# Patient Record
Sex: FEMALE | Race: OTHER | NOT HISPANIC OR LATINO | Employment: FULL TIME | URBAN - METROPOLITAN AREA
[De-identification: names, ages, dates, MRNs, and addresses within clinical notes are randomized per-mention and may not be internally consistent; named-entity substitution may affect disease eponyms.]

---

## 2024-08-13 ENCOUNTER — NON-PROVIDER VISIT (OUTPATIENT)
Dept: URGENT CARE | Facility: PHYSICIAN GROUP | Age: 44
End: 2024-08-13
Payer: COMMERCIAL

## 2024-08-13 ENCOUNTER — HOSPITAL ENCOUNTER (OUTPATIENT)
Dept: RADIOLOGY | Facility: MEDICAL CENTER | Age: 44
End: 2024-08-13
Attending: FAMILY MEDICINE
Payer: COMMERCIAL

## 2024-08-13 ENCOUNTER — OCCUPATIONAL MEDICINE (OUTPATIENT)
Dept: URGENT CARE | Facility: PHYSICIAN GROUP | Age: 44
End: 2024-08-13
Payer: COMMERCIAL

## 2024-08-13 VITALS
HEIGHT: 64 IN | BODY MASS INDEX: 32.03 KG/M2 | DIASTOLIC BLOOD PRESSURE: 80 MMHG | TEMPERATURE: 97.5 F | WEIGHT: 187.6 LBS | OXYGEN SATURATION: 99 % | HEART RATE: 76 BPM | RESPIRATION RATE: 22 BRPM | SYSTOLIC BLOOD PRESSURE: 126 MMHG

## 2024-08-13 DIAGNOSIS — S69.92XA INJURY OF LEFT HAND, INITIAL ENCOUNTER: ICD-10-CM

## 2024-08-13 DIAGNOSIS — Z02.1 PRE-EMPLOYMENT DRUG SCREENING: ICD-10-CM

## 2024-08-13 DIAGNOSIS — S60.222A CONTUSION OF LEFT HAND, INITIAL ENCOUNTER: ICD-10-CM

## 2024-08-13 LAB
AMP AMPHETAMINE: NORMAL
BREATH ALCOHOL COMMENT: NORMAL
COC COCAINE: NORMAL
INT CON NEG: NORMAL
INT CON POS: NORMAL
MET METHAMPHETAMINES: NORMAL
OPI OPIATES: NORMAL
PCP PHENCYCLIDINE: NORMAL
POC BREATHALIZER: 0 PERCENT (ref 0–0.01)
POC DRUG COMMENT 753798-OCCUPATIONAL HEALTH: NEGATIVE
THC: NORMAL

## 2024-08-13 PROCEDURE — 3074F SYST BP LT 130 MM HG: CPT | Performed by: FAMILY MEDICINE

## 2024-08-13 PROCEDURE — 82075 ASSAY OF BREATH ETHANOL: CPT | Performed by: FAMILY MEDICINE

## 2024-08-13 PROCEDURE — 3079F DIAST BP 80-89 MM HG: CPT | Performed by: FAMILY MEDICINE

## 2024-08-13 PROCEDURE — 99203 OFFICE O/P NEW LOW 30 MIN: CPT | Performed by: FAMILY MEDICINE

## 2024-08-13 PROCEDURE — 73130 X-RAY EXAM OF HAND: CPT | Mod: LT

## 2024-08-13 PROCEDURE — 80305 DRUG TEST PRSMV DIR OPT OBS: CPT | Performed by: FAMILY MEDICINE

## 2024-08-13 RX ORDER — IBUPROFEN 200 MG
600 TABLET ORAL ONCE
Status: COMPLETED | OUTPATIENT
Start: 2024-08-13 | End: 2024-08-13

## 2024-08-13 RX ADMIN — Medication 600 MG: at 17:46

## 2024-08-13 NOTE — LETTER
PHYSICIAN’S AND CHIROPRACTIC PHYSICIAN'S   PROGRESS REPORT CERTIFICATION OF DISABILITY Claim Number:     Social Security Number:    Patient’s Name:Gale Hale Date of Injury:8/13/2024   Employer: [employer] Name of O (if applicable)      Patient’s Job Description/Occupation: housekeeping       Previous Injuries/Diseases/Surgeries Contributing to the Condition:  NA      Diagnosis:  (S69.92XA) Injury of left hand, initial encounter  (S60.222A) Contusion of left hand, initial encounter      Related to the Industrial Injury? Yes     Explain:Left hand struck between closing sliding door and frame      Objective Medical Findings:Left hand: tender 2-4 metatarsal, MCP joint, and proximal phalanges without obvious deformity. Skin intact. Distal neuro/vascular intact.           None - Discharged                         Stable  No                 Ratable  No       Generally Improved                        Condition Worsened               X  Condition Same  May Have Suffered a Permanent Disability  No     Treatment Plan:    Relative rest, ice, compression, OTC NSAID as needed        No Change in Therapy                 PT/OT Prescribed                   X  Medication May be Used While Working       Case Management                        PT/OT Discontinued    Consultation    Further Diagnostic Studies:                               Prescription(s) Left hand x-ray negative for fracture or dislocation                           Released to FULL DUTY /No Restrictions on (Date):  From:      Certified TOTALLY TEMPORARILY DISABLED (Indicate Dates) From:   To:    X  Released to RESTRICTED/Modified Duty on (Date): From: 8/13/2024 To: 8/16/2024                                                               Restrictions Are:  Temporary     No Sitting                               No Standing                 X  No Pulling                  Other: Restrictions are for left upper extremity only     No Bending at Waist           No Stooping                  X  No Lifting       No Carrying                           No Walking                Lifting Restricted to (lbs.):     X  No Pushing                            No Climbing                   No Reaching Above Shoulders   Date of Next Visit:  8/16/2024 Date of this Exam:8/13/2024 Physician/Chiropractic Physician Name:Yousif Shah M.D. Physician/Chiropractic Physician Signature:  Imtiaz Null DO MPH   D-39 (Rev. 2/24)

## 2024-08-13 NOTE — LETTER
"    EMPLOYEE’S CLAIM FOR COMPENSATION/ REPORT OF INITIAL TREATMENT  FORM C-4  PLEASE TYPE OR PRINT    EMPLOYEE’S CLAIM - PROVIDE ALL INFORMATION REQUESTED   First Name                    ROHITH Beasley                  Last Name  Shayna Hale Birthdate                    1980                Sex  Female Claim Number (Insurer’s Use Only)     Home Address  [address] Age  43 y.o. Height  1.62 m (5' 3.78\") Weight  85.1 kg (187 lb 9.6 oz) Social Security Number     [facility] Zip  [address] Telephone  There are no phone numbers on file.   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 (if applicable)  Closing a guest door.    How did this injury or occupational disease occur? (Be specific and answer in detail. Use additional sheet if necessary)  I was closing a guest's door and I tried to turn off the light quickly and by accident i hit my hand with the door   If you believe that you have an occupational disease, when did you first have knowledge of the disability and its relationship to your employment?  N/A Witnesses to the Accident (if applicable)  N/A      Nature of Injury or Occupational " Disease  Crushing  Part(s) of Body Injured or Affected  Hand (R) N/A N/A    I CERTIFY THAT THE ABOVE IS TRUE AND CORRECT TO T HE BEST OF MY KNOWLEDGE AND THAT I HAVE PROVIDED THIS INFORMATION IN ORDER TO OBTAIN THE BENEFITS OF [place], OR [place]  I HEREBY AUTHORIZE ANY PHYSICIAN, CHIROPRACTOR, SURGEON, PRACTITIONER OR ANY OTHER PERSON, ANY HOSPITAL, INCLUDING [facility] OR [facility], ANY  MEDICAL SERVICE ORGANIZATION, ANY INSURANCE COMPANY, OR OTHER INSTITUTION OR ORGANIZATION TO RELEASE TO EACH OTHER, ANY MEDICAL OR OTHER INFORMATION, INCLUDING BENEFITS PAID OR PAYABLE, PERTINENT TO THIS INJURY OR DISEASE, EXCEPT INFORMATION RELATIVE TO DIAGNOSIS, TREATMENT AND/OR COUNSELING FOR AIDS, PSYCHOLOGICAL CONDITIONS, ALCOHOL OR CONTROLLED SUBSTANCES, FOR WHICH I MUST GIVE SPECIFIC AUTHORIZATION.  A PHOTOSTAT OF THIS AUTHORIZATION SHALL BE VALID AS THE ORIGINAL.     Date   Place Employee’s Original or  *Electronic Signature   THIS REPORT MUST BE COMPLETED AND MAILED WITHIN 3 WORKING DAYS OF TREATMENT   Place  [facility]    Name of Facility  [facility]   Date 8/13/2024 Diagnosis and Description of Injury or Occupational Disease  (S69.92XA) Injury of left hand, initial encounter  (S60.222A) Contusion of left hand, initial encounter  Diagnoses of Injury of left hand, initial encounter and Contusion of left hand, initial encounter were pertinent to this visit. Is there evidence that the injured employee was under the influence of alcohol and/or another controlled substance at the time of accident?  []No  [] Yes (if yes, please explain)   Hour 5:24 PM  No   Treatment: Relative rest, ice, compression, otc nsaid    Have you advised the patient to remain off work five days or more?   [] Yes Indicate dates: From   To    [] No      If no, is the injured employee capable of: [] full duty [] modified duty                      If modified duty, specify any limitations / restrictions:  No lifting, carrying, pushing, or pulling with left upper extremity                                                                                                                                                                                                                                                                                                                                                                                                               X-Ray Findings: Negative    From information given by the employee, together with medical evidence, can you directly connect this injury or occupational disease as job incurred?  []Yes   [] No Yes    Is additional medical care by a physician indicated? []Yes [] No  Yes    Do you know of any previous injury or disease contributing to this condition or occupational disease? []Yes [] No (Explain if yes)                          No   Date  8/13/2024 Print Health Care Provider’s Name  Yousif Shah M.D. I certify that the employer’s copy of  this form was delivered to the employer on:   Address  [address] INSURER'S USE ONLY                       [place] [facility] Zip  [address] Provider’s Tax ID Number  [id number]   Telephone  Dept: [phone]    Health Care Provider’s Original or Electronic Signature  e-YOUSIF Alonzo M.D. Degree (MD,DO, DC,PA-C,APRN)  MD  Choose (if applicable)      ORIGINAL - TREATING HEALTHCARE PROVIDER PAGE 2 - INSURER/TPA PAGE 3 - EMPLOYER PAGE 4 - EMPLOYEE             Form C-4 (rev.08/23)

## 2024-08-16 ENCOUNTER — OCCUPATIONAL MEDICINE (OUTPATIENT)
Dept: URGENT CARE | Facility: PHYSICIAN GROUP | Age: 44
End: 2024-08-16
Payer: COMMERCIAL

## 2024-08-16 VITALS
DIASTOLIC BLOOD PRESSURE: 68 MMHG | BODY MASS INDEX: 33.13 KG/M2 | WEIGHT: 187 LBS | TEMPERATURE: 97.5 F | OXYGEN SATURATION: 95 % | RESPIRATION RATE: 18 BRPM | HEIGHT: 63 IN | SYSTOLIC BLOOD PRESSURE: 116 MMHG | HEART RATE: 73 BPM

## 2024-08-16 DIAGNOSIS — S60.222D CONTUSION OF LEFT HAND, SUBSEQUENT ENCOUNTER: ICD-10-CM

## 2024-08-16 PROCEDURE — 99213 OFFICE O/P EST LOW 20 MIN: CPT

## 2024-08-16 PROCEDURE — 3074F SYST BP LT 130 MM HG: CPT

## 2024-08-16 PROCEDURE — 3078F DIAST BP <80 MM HG: CPT

## 2024-08-16 NOTE — LETTER
PHYSICIAN’S AND CHIROPRACTIC PHYSICIAN'S   PROGRESS REPORT CERTIFICATION OF DISABILITY Claim Number:     Social Security Number:    Patient’s Name:Gale Hale Date of Injury:8/13/2024   Employer: [employer] Name of O (if applicable)      Patient’s Job Description/Occupation: housekeeping       Previous Injuries/Diseases/Surgeries Contributing to the Condition:         Diagnosis:  (S60.222D) Contusion of left hand, subsequent encounter      Related to the Industrial Injury? Yes     Explain:The patient is here today for a workmen's compensation follow-up visit.   DOI: 8/13/2024    KARMA: she sustained an injury to her hand on Tuesday morning while attempting to close a door and operate a light switch at her workplace, where she is employed as a . The door inadvertently struck her hand during this process.    Since the incident, she has not returned to work due to restrictions advised by previous provider, which include limitations on pushing and pulling activities. Currently, she reports feeling well and has been managing the pain with ibuprofen. She expresses a desire to return to work.         ROS per HPI      Objective Medical Findings:Physical Exam  Left Hand: No deformity, no erythema, no swelling full range of motion, 5/5  strength          None - Discharged                         Stable  Yes                 Ratable  No     X  Generally Improved                        Condition Worsened                 Condition Same  May Have Suffered a Permanent Disability  No     Treatment Plan:             No Change in Therapy                 PT/OT Prescribed                     Medication May be Used While Working       Case Management                        PT/OT Discontinued    Consultation    Further Diagnostic Studies:                               Prescription(s)                           X  Released to FULL DUTY /No Restrictions on (Date):  From:  8/16/2024    Certified TOTALLY TEMPORARILY DISABLED (Indicate Dates) From:   To:      Released to RESTRICTED/Modified Duty on (Date): From:   To:                                                                 Restrictions Are:  Temporary     No Sitting                               No Standing                   No Pulling                  Other: Return in 4 days. Trial full duty    No Bending at Waist           No Stooping                    No Lifting       No Carrying                           No Walking                Lifting Restricted to (lbs.):       No Pushing                            No Climbing                   No Reaching Above Shoulders   Date of Next Visit:  8/20/2024 Date of this Exam:8/16/2024 Physician/Chiropractic Physician Name:RUDY Silver Physician/Chiropractic Physician Signature:  Imtiaz Null DO MPH   D-39 (Rev. 2/24)

## 2024-08-16 NOTE — PROGRESS NOTES
"Verbal consent was acquired by the patient to use [employer] ambient listening note generation during this visit   Subjective:   Gale Hale is a 43 y.o. female who presents for Follow-Up (W/c /Left hand, feeling better/)      HPI:  History of Present Illness  The patient is here today for a workmen's compensation follow-up visit.   DOI: 8/13/2024    KARMA: she sustained an injury to her hand on Tuesday morning while attempting to close a door and operate a light switch at her workplace, where she is employed as a . The door inadvertently struck her hand during this process.    Since the incident, she has not returned to work due to restrictions advised by previous provider, which include limitations on pushing and pulling activities. Currently, she reports feeling well and has been managing the pain with ibuprofen. She expresses a desire to return to work.       ROS per HPI        Problem list, medications, and allergies reviewed by myself today in Epic.     Objective:     /68   Pulse 73   Temp 36.4 °C (97.5 °F) (Temporal)   Resp 18   Ht 1.6 m (5' 3\")   Wt 84.8 kg (187 lb)   SpO2 95%   BMI 33.13 kg/m²     Physical Exam  Left Hand: No deformity, no erythema, no swelling full range of motion, 5/5  strength    Assessment/Plan:     Diagnosis and associated orders:        1. Contusion of left hand, subsequent encounter            Comments/MDM:   Pt is clinically stable at today's acute urgent care visit.  No acute distress noted. Appropriate for outpatient management at this time.     Assessment & Plan    The injury occurred on 08/13/2024 when her hand was smashed by a door while she was trying to turn on a light switch. She has not been to work since the injury due to work restrictions. She reports feeling pretty good currently and has been using ibuprofen for pain management. On examination, she experienced very mild pain when squeezing fingers. She is deemed fit to " resume full duty on a trial basis. Continuation of ibuprofen is advised. A release for full duty has been provided on a trial basis.   Follow-up  A follow-up visit is scheduled in 4 days.            Discussed DDx, management options (risks,benefits, and alternatives to planned treatment), natural progression and supportive care.  Expressed understanding and the treatment plan was agreed upon. Questions were encouraged and answered   Return to urgent care prn if new or worsening sx or if there is no improvement in condition prn.    Educated in Red flags and indications to immediately call 911 or present to the Emergency Department.   Advised the patient to follow-up with the primary care physician for recheck, reevaluation, and consideration of further management.    I personally reviewed prior external notes and test results pertinent to today's visit.  I have independently reviewed and interpreted all diagnostics ordered during this urgent care acute visit.       Please note that this dictation was created using voice recognition software. I have made a reasonable attempt to correct obvious errors, but I expect that there are errors of grammar and possibly content that I did not discover before finalizing the note.    This note was electronically signed by GERBER Aldana

## 2024-08-17 ASSESSMENT — ENCOUNTER SYMPTOMS
SENSORY CHANGE: 0
FOCAL WEAKNESS: 0

## 2024-08-17 NOTE — PROGRESS NOTES
"Subjective     Gale Hale is a 43 y.o. 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    Pain is moderate.  Worse with use.  Right-hand-dominant.  No prior injury.  No abrasion or laceration. No second job or outside activity contributing.        Review of Systems   Musculoskeletal:  Negative for joint pain.   Neurological:  Negative for sensory change and focal weakness.              Objective     /80   Pulse 76   Temp 36.4 °C (97.5 °F) (Temporal)   Resp (!) 22   Ht 1.62 m (5' 3.78\")   Wt 85.1 kg (187 lb 9.6 oz)   SpO2 99%   BMI 32.42 kg/m²      Physical Exam  Constitutional:       Appearance: Normal appearance.   Skin:     General: Skin is warm and dry.      Findings: No rash.   Neurological:      Mental Status: She is alert.         Left hand: tender 2-4 metatarsal, MCP joint, and proximal phalanges without obvious deformity. Skin intact. Distal neuro/vascular intact.                      Assessment & Plan      Xray: no fracture or dislocation per radiology    Assessment & Plan  Injury of left hand, initial encounter    Orders:    DX-HAND 3+ LEFT; Future    ibuprofen (Motrin) tablet 600 mg    Contusion of left hand, initial encounter          Relative rest, ice, nsaid prn. Elevation and compression prn swelling. Resume activity as tolerated.    Ace wrap in clinic.     Work restrictions on d39             "

## 2024-08-20 ENCOUNTER — OCCUPATIONAL MEDICINE (OUTPATIENT)
Dept: URGENT CARE | Facility: PHYSICIAN GROUP | Age: 44
End: 2024-08-20
Payer: COMMERCIAL

## 2024-08-20 VITALS
WEIGHT: 185.4 LBS | RESPIRATION RATE: 16 BRPM | OXYGEN SATURATION: 97 % | HEART RATE: 82 BPM | DIASTOLIC BLOOD PRESSURE: 60 MMHG | HEIGHT: 62 IN | TEMPERATURE: 98.1 F | SYSTOLIC BLOOD PRESSURE: 112 MMHG | BODY MASS INDEX: 34.12 KG/M2

## 2024-08-20 DIAGNOSIS — S60.222D CONTUSION OF LEFT HAND, SUBSEQUENT ENCOUNTER: ICD-10-CM

## 2024-08-20 PROCEDURE — 3078F DIAST BP <80 MM HG: CPT | Performed by: NURSE PRACTITIONER

## 2024-08-20 PROCEDURE — 3074F SYST BP LT 130 MM HG: CPT | Performed by: NURSE PRACTITIONER

## 2024-08-20 PROCEDURE — 99213 OFFICE O/P EST LOW 20 MIN: CPT | Performed by: NURSE PRACTITIONER

## 2024-08-20 NOTE — LETTER
PHYSICIAN’S AND CHIROPRACTIC PHYSICIAN'S   PROGRESS REPORT CERTIFICATION OF DISABILITY Claim Number:     Social Security Number:    Patient’s Name:Gale Hale Date of Injury:8/13/2024   Employer: [employer] Name of [employer] (if applicable)      Patient’s Job Description/Occupation: housekeeping       Previous Injuries/Diseases/Surgeries Contributing to the Condition:  none      Diagnosis:  (S60.222D) Contusion of left hand, subsequent encounter      Related to the Industrial Injury? Yes     Explain:DOI: 8/13/2024   KARMA: she sustained an injury to her hand on Tuesday morning while attempting to close a door and operate a light switch at her workplace, where she is employed as a . The door inadvertently struck her hand during this process.    FV #3- 8/20/24: pt reports about 80% improvement since DOI. She has worked the last 4 days at full duty and is tolerating it fine.       Objective Medical Findings:       X  None - Discharged                         Stable  Yes                 Ratable  No     X  Generally Improved                        Condition Worsened                 Condition Same  May Have Suffered a Permanent Disability  No     Treatment Plan:    Discharged MMI        No Change in Therapy                 PT/OT Prescribed                     Medication May be Used While Working       Case Management                        PT/OT Discontinued    Consultation    Further Diagnostic Studies:                               Prescription(s)                           X  Released to FULL DUTY /No Restrictions on (Date):  From:      Certified TOTALLY TEMPORARILY DISABLED (Indicate Dates) From:   To:      Released to RESTRICTED/Modified Duty on (Date): From:   To:                                                                 Restrictions Are:  Temporary     No Sitting                               No Standing                   No Pulling                  Other:       No Bending at Waist           No Stooping                    No Lifting       No Carrying                           No Walking                Lifting Restricted to (lbs.):       No Pushing                            No Climbing                   No Reaching Above Shoulders   Date of Next Visit:    Date of this Exam:8/20/2024 Physician/Chiropractic Physician Name:RUDY Rowley Physician/Chiropractic Physician Signature:  Imtiaz Null DO MPH   D-39 (Rev. 2/24)

## 2024-08-21 NOTE — PROGRESS NOTES
"Subjective     Gale Hale is a 43 y.o. female who presents with Follow-Up (WC, Left hand, DOI: 8/13/2024)            DOI: 8/13/2024   KARMA: she sustained an injury to her hand on Tuesday morning while attempting to close a door and operate a light switch at her workplace, where she is employed as a . The door inadvertently struck her hand during this process.     FV #3- 8/20/24: pt reports about 80% improvement since DOI. She has worked the last 4 days at full duty and is tolerating it fine.           Review of Systems   Musculoskeletal:         Left hand injury   All other systems reviewed and are negative.             Objective     /60   Pulse 82   Temp 36.7 °C (98.1 °F) (Temporal)   Resp 16   Ht 1.575 m (5' 2\")   Wt 84.1 kg (185 lb 6.4 oz)   SpO2 97%   BMI 33.91 kg/m²      Physical Exam  Vitals and nursing note reviewed.   Constitutional:       Appearance: Normal appearance. She is normal weight.   HENT:      Head: Normocephalic and atraumatic.      Nose: Nose normal.      Mouth/Throat:      Mouth: Mucous membranes are moist.      Pharynx: Oropharynx is clear.   Eyes:      Extraocular Movements: Extraocular movements intact.      Pupils: Pupils are equal, round, and reactive to light.   Cardiovascular:      Rate and Rhythm: Normal rate and regular rhythm.   Pulmonary:      Effort: Pulmonary effort is normal.   Musculoskeletal:         General: Normal range of motion.      Cervical back: Normal range of motion.   Skin:     General: Skin is warm and dry.      Capillary Refill: Capillary refill takes less than 2 seconds.   Neurological:      General: No focal deficit present.      Mental Status: She is alert and oriented to person, place, and time. Mental status is at baseline.   Psychiatric:         Mood and Affect: Mood normal.         Speech: Speech normal.         Thought Content: Thought content normal.         Judgment: Judgment normal.                       "       Assessment & Plan        Assessment & Plan  Contusion of left hand, subsequent encounter       tolerating full duty  Discharged MMI

## 2024-10-23 PROCEDURE — RXMED WILLOW AMBULATORY MEDICATION CHARGE: Performed by: INTERNAL MEDICINE

## 2024-10-24 ENCOUNTER — PHARMACY VISIT (OUTPATIENT)
Dept: PHARMACY | Facility: MEDICAL CENTER | Age: 44
End: 2024-10-24
Payer: COMMERCIAL

## 2025-02-05 ENCOUNTER — OFFICE VISIT (OUTPATIENT)
Dept: OBGYN | Facility: CLINIC | Age: 45
End: 2025-02-05
Payer: COMMERCIAL

## 2025-02-05 VITALS
HEART RATE: 66 BPM | WEIGHT: 185 LBS | BODY MASS INDEX: 31.58 KG/M2 | DIASTOLIC BLOOD PRESSURE: 75 MMHG | HEIGHT: 64 IN | SYSTOLIC BLOOD PRESSURE: 132 MMHG

## 2025-02-05 DIAGNOSIS — Z12.11 COLON CANCER SCREENING: ICD-10-CM

## 2025-02-05 DIAGNOSIS — Z90.710 HISTORY OF TOTAL ABDOMINAL HYSTERECTOMY: ICD-10-CM

## 2025-02-05 DIAGNOSIS — Z01.419 WOMEN'S ANNUAL ROUTINE GYNECOLOGICAL EXAMINATION: ICD-10-CM

## 2025-02-05 PROCEDURE — 99386 PREV VISIT NEW AGE 40-64: CPT | Performed by: STUDENT IN AN ORGANIZED HEALTH CARE EDUCATION/TRAINING PROGRAM

## 2025-02-05 PROCEDURE — 3078F DIAST BP <80 MM HG: CPT | Performed by: STUDENT IN AN ORGANIZED HEALTH CARE EDUCATION/TRAINING PROGRAM

## 2025-02-05 PROCEDURE — 3075F SYST BP GE 130 - 139MM HG: CPT | Performed by: STUDENT IN AN ORGANIZED HEALTH CARE EDUCATION/TRAINING PROGRAM

## 2025-02-05 NOTE — PROGRESS NOTES
ANNUAL GYNECOLOGY VISIT    Chief Complaint  Annual Exam    Mariano Hale is a 44 y.o. female  No LMP recorded. Patient has had a hysterectomy. She presents today for Annual Exam. Amharic Interpretor: [id number] utilized today.     Patient reports she had total abdominal hysterectomy with unilateral oophorectomy (she is unsure which ovary was removed but states she does still have one ovary remaining) done in  due to a large fibroid. Unable to obtain records as patient had procedure done in [place]. She states that her last PAP was done in  2021 and normal per patient, no hx of abnormal. Denies vaginal bleeding, no pelvic pain. No other concerns today.     Several days ago she had her MMG done at [facility] and was told it was normal. BOSSMAN signed today.     Preventive Care   Immunization History   Administered Date(s) Administered    Hepatitis B Vaccine (Adol/Adult) 2024    Influenza split virus trivalent (PF) 10/24/2024     Last Mammogram: Several days ago at [facility]. Normal per patient report. BOSSMAN signed.   Last Colonoscopy: Not yet had one. Would like ColoGuard, ordered today.   Last DEXA: Not indicated  H/o osteoporosis or osteopenia: no  HPV vaccine: unsure  Last Pap: 2021 and normal per patient report.   History of abnormal pap: No, per patient.     Gynecology History  Current Sexual Activity: no  History of sexually transmitted diseases? no  Abnormal vaginal discharge? no  Perimenopause sx: Yes:  and Hot flashes on occasion. Declines any medications for management of hot flashes at this time.   Postmenopausal bleeding: No  Significant pelvic pain: No  Urinary incontinence: no    Menstrual History  No LMP recorded. Patient has had a hysterectomy.    Contraception  Current: JUSTA with unilateral oophorectomy (unknown which ovary was removed).    Cancer Risk Assessement:  Family history of:   - Breast cancer: paternal grandmother, dx around  "46yo   - Ovarian cancer: no   - Uterine cancer: paternal grandmother, dx around 46yo.    - Colon cancer: no    Obstetric History  OB History    Para Term  AB Living   0 0 0 0 0 0   SAB IAB Ectopic Molar Multiple Live Births   0 0 0 0 0 0       Past Medical History  Past Medical History:   Diagnosis Date    Anemia     Blood transfusion without reported diagnosis     Hyperlipidemia        Past Surgical History  Past Surgical History:   Procedure Laterality Date    VAGINAL HYSTERECTOMY TOTAL  2022       Social History  Social History     Tobacco Use    Smoking status: Never    Smokeless tobacco: Never   Vaping Use    Vaping status: Never Used   Substance Use Topics    Alcohol use: Yes     Comment: socially    Drug use: Never        Family History  Family History   Problem Relation Age of Onset    No Known Problems Mother     No Known Problems Father     Depression Maternal Grandmother     Parkinson's Disease Maternal Grandmother     No Known Problems Maternal Grandfather     Breast Cancer Paternal Grandmother     Uterine cancer Paternal Grandmother     No Known Problems Paternal Grandfather        Home Medications  Current Outpatient Medications   Medication Sig    influenza vaccine (FLUZONE) 0.5 ML Suspension Prefilled Syringe injection Inject 0.5 mL into the shoulder, thigh, or buttocks. (Patient not taking: Reported on 2025)       Allergies/Reactions  No Known Allergies    ROS  Review of Systems:  Gen: no fevers or chills, no significant weight loss or gain  Respiratory:  no cough or dyspnea  Cardiac:  no chest pain, no palpitations, no syncope  Breast: no breast discharge, pain, lump or skin changes  GI:  no heartburn, no abdominal pain, no nausea or vomiting  Psych: no depression or anxiety  Neuro: no migraines with aura, fainting spells, numbness or tingling    Objective  /75 (BP Location: Right arm, Patient Position: Sitting, BP Cuff Size: Adult)   Pulse 66   Ht 5' 3.78\"   Wt 185 " lb   BMI 31.97 kg/m²     Constitutional: The patient is well developed and well nourished.  Psychiatric: Patient is oriented to time place and person.   Skin: No rash observed.  Neck: Appears symmetric. Thyroid normal size  Respiratory: normal effort  Breast: Inspection reveals no asymetry or nipple discharge, no skin thickening, dimpling or erythema.  Palpation demonstrates no masses.  Abdomen: Soft, non-tender.  Pelvic Exam:      Vulva: external female genitalia are normal in appearance. No lesions     Urethra - no lesions, no erythema     Vagina: moist, pink, normal ruggae     Cervix: Absent     Uterus: Absent.     Ovaries: Non-tender, no appreciable masses   Pap Smear performed: No   Chaperone Present: Silvano Ag MA  Extremities: Legs are symmetric and without tenderness. There is no edema present.    Labs/Imaging:  There are no active problems to display for this patient.      Assessment & Plan  Gale Hale is a 44 y.o. female who presents today for Annual Gyn Exam.     Assessment & Plan  Women's annual routine gynecological examination  - Cervical cancer screening: Pap: Not indicated. Patient had JUSTA with unilateral oophorectomy (unknown which ovary remains) done in 2022 secondary to large uterine fibroid. Procedure done in [place], unable to obtain records.   - STI Screen (HIV, Syphilis, Chlamydia / Gonorrhea): declined  - Mammogram: Done several days ago at [facility], normal per patient report. BOSSMAN signed today.   - Colonoscopy: Ordered ColoGuard today.   - DEXA: Not indicated   - Immunizations: Recommended Flu and vaccine each season and COVID boosters when indicated.  - Tobacco: Encouraged continued abstinence.  - Diabetes and Cholesterol Screen: Recommend f/u with PCP.  - Counseling: breast self exam, mammography screening, signs of perimenopause/ menopause, treatments for vasomotor symptoms and avoiding triggering factors.        History of total abdominal  hysterectomy  - Per patient report, she had JUSTA with unilateral oophorectomy (unknown which ovary remains) done in 2022 secondary to large uterine fibroid. Procedure done in [place], unable to obtain records.  - No pelvic pain, no vaginal bleeding. If vaginal bleeding or pelvic pain returns, recommend she returns to clinic.        Colon cancer screening  - Patient has not yet had colon cancer screening. Discussed colon cancer screening starting at 46 yo. She desires starting off with ColoGuard.   Orders:    Cologuard Colon Cancer Screening (FIT DNA)      Return: Annually or PRN    Jillian King P.A.-C.  [facility]   2/5/2025

## 2025-02-05 NOTE — PROGRESS NOTES
Pt presents for annual exam   Last seen on: New Patient  Phone: [phone]   Pharmacy verified   LMP: hysterectomy   BCM: total hysterectomy done in 2022  Sexually active: not currently   Last pap: ?  ^ does have a hx of +HPV   Last PATTIE: few days ago states everything came back normal.   Pt states since she had the hysterectomy she has not been to the OBGYN office and would a check up.   Does want a breast and pelvic exam today

## 2025-02-06 NOTE — ASSESSMENT & PLAN NOTE
- Per patient report, she had JUSTA with unilateral oophorectomy (unknown which ovary remains) done in 2022 secondary to large uterine fibroid. Procedure done in [place], unable to obtain records.  - No pelvic pain, no vaginal bleeding. If vaginal bleeding or pelvic pain returns, recommend she returns to clinic.

## 2025-02-25 ENCOUNTER — NON-PROVIDER VISIT (OUTPATIENT)
Dept: OCCUPATIONAL MEDICINE | Facility: CLINIC | Age: 45
End: 2025-02-25

## 2025-02-25 DIAGNOSIS — Z23 ENCOUNTER FOR IMMUNIZATION: ICD-10-CM
